# Patient Record
Sex: FEMALE | Race: WHITE | NOT HISPANIC OR LATINO | Employment: UNEMPLOYED | URBAN - METROPOLITAN AREA
[De-identification: names, ages, dates, MRNs, and addresses within clinical notes are randomized per-mention and may not be internally consistent; named-entity substitution may affect disease eponyms.]

---

## 2023-10-10 ENCOUNTER — HOSPITAL ENCOUNTER (EMERGENCY)
Facility: HOSPITAL | Age: 53
Discharge: HOME | End: 2023-10-10
Attending: EMERGENCY MEDICINE
Payer: COMMERCIAL

## 2023-10-10 VITALS
BODY MASS INDEX: 26.2 KG/M2 | SYSTOLIC BLOOD PRESSURE: 120 MMHG | HEIGHT: 66 IN | OXYGEN SATURATION: 99 % | DIASTOLIC BLOOD PRESSURE: 63 MMHG | WEIGHT: 163 LBS | HEART RATE: 72 BPM | RESPIRATION RATE: 16 BRPM | TEMPERATURE: 98 F

## 2023-10-10 DIAGNOSIS — H81.399 EPISODIC PERIPHERAL VERTIGO: Primary | ICD-10-CM

## 2023-10-10 DIAGNOSIS — S16.1XXA CERVICAL STRAIN, ACUTE: ICD-10-CM

## 2023-10-10 DIAGNOSIS — S09.90XA CLOSED HEAD INJURY: ICD-10-CM

## 2023-10-10 DIAGNOSIS — R55 SYNCOPE: ICD-10-CM

## 2023-10-10 DIAGNOSIS — S39.012A LUMBAR STRAIN, INITIAL ENCOUNTER: ICD-10-CM

## 2023-10-10 LAB
ALBUMIN SERPL BCP-MCNC: 4.4 G/DL (ref 3.5–5.2)
ALP SERPL-CCNC: 83 U/L (ref 55–135)
ALT SERPL W/O P-5'-P-CCNC: 15 U/L (ref 10–44)
ANION GAP SERPL CALC-SCNC: 10 MMOL/L (ref 8–16)
AST SERPL-CCNC: 20 U/L (ref 10–40)
BASOPHILS # BLD AUTO: 0.02 K/UL (ref 0–0.2)
BASOPHILS NFR BLD: 0.2 % (ref 0–1.9)
BILIRUB SERPL-MCNC: 0.4 MG/DL (ref 0.1–1)
BUN SERPL-MCNC: 16 MG/DL (ref 6–20)
CALCIUM SERPL-MCNC: 9.9 MG/DL (ref 8.7–10.5)
CHLORIDE SERPL-SCNC: 101 MMOL/L (ref 95–110)
CO2 SERPL-SCNC: 26 MMOL/L (ref 23–29)
CREAT SERPL-MCNC: 0.8 MG/DL (ref 0.5–1.4)
DIFFERENTIAL METHOD: ABNORMAL
EOSINOPHIL # BLD AUTO: 0 K/UL (ref 0–0.5)
EOSINOPHIL NFR BLD: 0.3 % (ref 0–8)
ERYTHROCYTE [DISTWIDTH] IN BLOOD BY AUTOMATED COUNT: 12.3 % (ref 11.5–14.5)
EST. GFR  (NO RACE VARIABLE): >60 ML/MIN/1.73 M^2
GLUCOSE SERPL-MCNC: 107 MG/DL (ref 70–110)
HCT VFR BLD AUTO: 36.2 % (ref 37–48.5)
HGB BLD-MCNC: 11.7 G/DL (ref 12–16)
IMM GRANULOCYTES # BLD AUTO: 0.02 K/UL (ref 0–0.04)
IMM GRANULOCYTES NFR BLD AUTO: 0.2 % (ref 0–0.5)
LYMPHOCYTES # BLD AUTO: 1.1 K/UL (ref 1–4.8)
LYMPHOCYTES NFR BLD: 11.6 % (ref 18–48)
MCH RBC QN AUTO: 29.4 PG (ref 27–31)
MCHC RBC AUTO-ENTMCNC: 32.3 G/DL (ref 32–36)
MCV RBC AUTO: 91 FL (ref 82–98)
MONOCYTES # BLD AUTO: 0.4 K/UL (ref 0.3–1)
MONOCYTES NFR BLD: 4.2 % (ref 4–15)
NEUTROPHILS # BLD AUTO: 7.8 K/UL (ref 1.8–7.7)
NEUTROPHILS NFR BLD: 83.5 % (ref 38–73)
NRBC BLD-RTO: 0 /100 WBC
PLATELET # BLD AUTO: 188 K/UL (ref 150–450)
PMV BLD AUTO: 11.3 FL (ref 9.2–12.9)
POCT GLUCOSE: 102 MG/DL (ref 70–110)
POTASSIUM SERPL-SCNC: 3.9 MMOL/L (ref 3.5–5.1)
PROT SERPL-MCNC: 7.7 G/DL (ref 6–8.4)
RBC # BLD AUTO: 3.98 M/UL (ref 4–5.4)
SODIUM SERPL-SCNC: 137 MMOL/L (ref 136–145)
WBC # BLD AUTO: 9.34 K/UL (ref 3.9–12.7)

## 2023-10-10 PROCEDURE — 80053 COMPREHEN METABOLIC PANEL: CPT | Performed by: EMERGENCY MEDICINE

## 2023-10-10 PROCEDURE — 93010 ELECTROCARDIOGRAM REPORT: CPT | Mod: ,,, | Performed by: INTERNAL MEDICINE

## 2023-10-10 PROCEDURE — 93010 EKG 12-LEAD: ICD-10-PCS | Mod: ,,, | Performed by: INTERNAL MEDICINE

## 2023-10-10 PROCEDURE — 85025 COMPLETE CBC W/AUTO DIFF WBC: CPT | Performed by: EMERGENCY MEDICINE

## 2023-10-10 PROCEDURE — 63600175 PHARM REV CODE 636 W HCPCS: Performed by: EMERGENCY MEDICINE

## 2023-10-10 PROCEDURE — 93005 ELECTROCARDIOGRAM TRACING: CPT

## 2023-10-10 PROCEDURE — 25000003 PHARM REV CODE 250: Performed by: EMERGENCY MEDICINE

## 2023-10-10 PROCEDURE — 99285 EMERGENCY DEPT VISIT HI MDM: CPT | Mod: 25

## 2023-10-10 PROCEDURE — 82962 GLUCOSE BLOOD TEST: CPT

## 2023-10-10 PROCEDURE — 96374 THER/PROPH/DIAG INJ IV PUSH: CPT

## 2023-10-10 RX ORDER — ONDANSETRON 4 MG/1
4 TABLET, ORALLY DISINTEGRATING ORAL
Status: COMPLETED | OUTPATIENT
Start: 2023-10-10 | End: 2023-10-10

## 2023-10-10 RX ORDER — HYDROCODONE BITARTRATE AND ACETAMINOPHEN 5; 325 MG/1; MG/1
1 TABLET ORAL EVERY 6 HOURS PRN
Qty: 12 TABLET | Refills: 0 | Status: SHIPPED | OUTPATIENT
Start: 2023-10-10

## 2023-10-10 RX ORDER — MORPHINE SULFATE 4 MG/ML
2 INJECTION, SOLUTION INTRAMUSCULAR; INTRAVENOUS
Status: COMPLETED | OUTPATIENT
Start: 2023-10-10 | End: 2023-10-10

## 2023-10-10 RX ADMIN — ONDANSETRON 4 MG: 4 TABLET, ORALLY DISINTEGRATING ORAL at 01:10

## 2023-10-10 RX ADMIN — SODIUM CHLORIDE 2000 ML: 9 INJECTION, SOLUTION INTRAVENOUS at 01:10

## 2023-10-10 RX ADMIN — MORPHINE SULFATE 2 MG: 4 INJECTION, SOLUTION INTRAMUSCULAR; INTRAVENOUS at 01:10

## 2023-10-10 NOTE — ED TRIAGE NOTES
"Pt arrives after "passing out" around 435 this morning, and after that fell an additional 4-5 times. States she fell from 3 steps and was unable to get up jameson to feeling weak and dizzy. Reports pain to right arms/ ribs, and tailbone. HX of vertigo.  "

## 2023-10-10 NOTE — ED PROVIDER NOTES
"Encounter Date: 10/10/2023       History     Chief Complaint   Patient presents with    Loss of Consciousness     States got dizzy and "passed out" x3 hours ago. States tried to get up, and fell 4 more times. Hit head on granite counter. Denies use of blood thinners. States started feel SOB after syncope. C/O RIGHT rib pain, neck pain, back pain and nausea. Reports hx vertigo and syncope. Denies unilateral weakness, slurred speech, or facial droop     HPI  This 53-year-old white female presents emergency room complaining of waking this morning at 4:30 a.m. in getting very dizzy and passing out.  The patient stood up and passed out several times this morning.  She is weak and dizzy with standing.  She had some shortness of breath earlier.  This is gone now.  She does have some nausea.  She has a history of vertigo but does not have vertigo today.  This is a generalized weakness sensation.  The patient has no focal neurologic deficits.  She does have some head pain after the trauma from the fall.  Review of patient's allergies indicates:   Allergen Reactions    Penicillins Hives     No past medical history on file.  No past surgical history on file.  No family history on file.  Social History     Tobacco Use    Smoking status: Former     Types: Cigarettes    Smokeless tobacco: Never   Substance Use Topics    Alcohol use: Not Currently    Drug use: Never     Review of Systems  The patient was questioned specifically with regard to the following.  General: Fever, chills, sweats. Neuro: Headache. Eyes: eye problems. ENT: Ear pain, sore throat. Cardiovascular: Chest pain. Respiratory: Cough, shortness of breath. Gastrointestinal: Abdominal pain, vomiting, diarrhea. Genitourinary: Painful urination.  Musculoskeletal: Arm and leg problems. Skin: Rash.  The review of systems was negative except for the following:  Headache, neck pain, syncope, generalized weakness, right shoulder pain.  Mild pain in the right elbow and right " wrist.  There is lumbar pain.  Physical Exam     Initial Vitals [10/10/23 1147]   BP Pulse Resp Temp SpO2   124/61 77 17 98.4 °F (36.9 °C) 100 %      MAP       --         Physical Exam  The patient was examined specifically for the following:   General:No significant distress, Good color, Warm and dry. Head and neck:Scalp atraumatic, Neck supple. Neurological:Appropriate conversation, Gross motor deficits. Eyes:Conjugate gaze, Clear corneas. ENT: No epistaxis. Cardiac: Regular rate and rhythm, Grossly normal heart tones. Pulmonary: Wheezing, Rales. Gastrointestinal: Abdominal tenderness, Abdominal distention. Musculoskeletal: Extremity deformity, Apparent pain with range of motion of the joints. Skin: Rash.   The findings on examination were normal except for the following:  The patient has some tenderness across the tops of her shoulders.  She is marked tenderness and pain with range of motion of the right shoulder.  There is midline cervical and midline lumbar tenderness.  The patient can stand and walk.  There is no ataxia or disequilibrium.  Mental status examination, cranial nerves, motor and sensory examination are normal.  The lungs are clear in the heart tones are normal.  Vital signs are stable.   ED Course   Procedures  Labs Reviewed   CBC W/ AUTO DIFFERENTIAL - Abnormal; Notable for the following components:       Result Value    RBC 3.98 (*)     Hemoglobin 11.7 (*)     Hematocrit 36.2 (*)     Gran # (ANC) 7.8 (*)     Gran % 83.5 (*)     Lymph % 11.6 (*)     All other components within normal limits   COMPREHENSIVE METABOLIC PANEL   POCT GLUCOSE   POCT GLUCOSE MONITORING CONTINUOUS     EKG Readings: (Independently Interpreted)   This patient is in normal sinus rhythm heart rate of 75.  There are no significant ST segment or T-wave changes.  There are low voltage QRS complexes.  There is no definite evidence of acute myocardial infarction or malignant arrhythmia.  This is doctor Gatica dictating an I  independently interpreted this EKG.       Imaging Results              CT Head Without Contrast (Final result)  Result time 10/10/23 14:39:13      Final result by Topher Live MD (10/10/23 14:39:13)                   Impression:      1. No acute intracranial abnormalities.      Electronically signed by: Topher Live MD  Date:    10/10/2023  Time:    14:39               Narrative:    EXAMINATION:  CT HEAD WITHOUT CONTRAST    CLINICAL HISTORY:  Head trauma, intracranial venous injury suspected; Unspecified injury of head, initial encounter    TECHNIQUE:  Low dose axial images were obtained through the head.  Coronal and sagittal reformations were also performed. Contrast was not administered.    COMPARISON:  None.    FINDINGS:  There is no evidence of acute major vascular territory infarct, hemorrhage, or mass.  There is no hydrocephalus.  There are no abnormal extra-axial fluid collections.  The paranasal sinuses and mastoid air cells are clear, and there is no evidence of calvarial fracture.  The visualized soft tissues are unremarkable.                                       X-Ray Lumbar Spine Ap And Lateral (Final result)  Result time 10/10/23 14:02:42      Final result by Topher Live MD (10/10/23 14:02:42)                   Impression:      1. No acute displaced fracture or dislocation of the lumbar spine.      Electronically signed by: Topher Live MD  Date:    10/10/2023  Time:    14:02               Narrative:    EXAMINATION:  XR LUMBAR SPINE AP AND LATERAL    CLINICAL HISTORY:  Lumbar strain;    TECHNIQUE:  AP, lateral and spot images were performed of the lumbar spine.    COMPARISON:  None    FINDINGS:  Three views lumbar spine.    Lateral imaging demonstrates minimal grade 1 anterolisthesis of L4 on L3.  There is multilevel mild disc space height loss primarily involving L5-S1.  There is multilevel lower lumbar facet arthropathy.  The sacral segments are aligned.  AP spinal alignment is  grossly unremarkable.  The bilateral sacroiliac joints are intact.                                       X-Ray Cervical Spine AP And Lateral (Final result)  Result time 10/10/23 14:03:39      Final result by Topher Live MD (10/10/23 14:03:39)                   Impression:      1. No acute displaced fracture or dislocation of the cervical spine.      Electronically signed by: Topher Live MD  Date:    10/10/2023  Time:    14:03               Narrative:    EXAMINATION:  XR CERVICAL SPINE AP LATERAL    CLINICAL HISTORY:  Strain of muscle, fascia and tendon at neck level, initial encounter    TECHNIQUE:  AP, lateral and open mouth views of the cervical spine were performed.    COMPARISON:  None.    FINDINGS:  Three views cervical spine.    Lateral imaging demonstrates adequate alignment of the cervical spine without significant vertebral body height loss.  There is disc space height loss involving C5-C6 and C6-C7.  The facet joints are aligned.  There is prominent anterior marginal osteophytosis projecting from C5, C6 and C7.  AP spinal alignment is unremarkable.  The visualized lung apices are clear.  The odontoid is intact.  The lateral masses of C1 are in anatomic relationship with C2.                                       X-ray Shoulder 2 or More Views Right (Final result)  Result time 10/10/23 14:00:54   Procedure changed from X-Ray Shoulder Trauma 3 view Right     Final result by Topher Live MD (10/10/23 14:00:54)                   Impression:      1. No acute displaced fracture or dislocation of the right shoulder.      Electronically signed by: Topher Live MD  Date:    10/10/2023  Time:    14:00               Narrative:    EXAMINATION:  XR SHOULDER COMPLETE 2 OR MORE VIEWS RIGHT    CLINICAL HISTORY:  Right shoulder contusion;    TECHNIQUE:  Two or three views of the right shoulder were performed.    COMPARISON:  None    FINDINGS:  Two views right shoulder.    The right humeral head  maintains appropriate relationship with the glenoid.  There is a punctate focus of calcification along the superolateral aspect of the humeral head suggesting sequela of calcific tendinosis.  The acromioclavicular joint is intact noting degenerative change.  No acute displaced rib fracture.  The right lung zones are grossly clear.  There is suspected remote injury of the clavicle.                                       Medications   morphine injection 2 mg (2 mg Intravenous Given 10/10/23 1314)   ondansetron disintegrating tablet 4 mg (4 mg Oral Given 10/10/23 1315)   sodium chloride 0.9% bolus 2,000 mL 2,000 mL (2,000 mLs Intravenous New Bag 10/10/23 1313)     Medical Decision Making  Amount and/or Complexity of Data Reviewed  Labs: ordered.  Radiology: ordered.    Risk  Prescription drug management.    Given the above, this patient presents to the emergency room complaining of syncope.  The patient had multiple episodes of syncope.  The patient fell and injured her coccyx.  She hit her head.  She hurt her right shoulder.  She had no chest pain shortness of breath or palpitations.  The patient was observed in the emergency room on the monitor.  There was no significant ectopy noted.  The patient reports a history of a heart murmur.  I do not hear a significant heart murmur.  There were no significant orthostatic vital sign changes.  The patient was not tachycardic during her evaluation.  CT of the head fails to reveal any intracranial bleeding given the history of fall with head trauma.  There is  significant anemia noted on laboratory evaluation.  The patient's hemoglobin was 12.  This is a mild anemia.  There is no leukocytosis to suggest the presence of infection.  The patient has chemistries are essentially unremarkable.  There is no evidence of hepatic or renal failure there are no electrolyte abnormalities.  I was able to have the patient stand and walk.  There is no ataxia.  I doubt posterior circulation  stroke.  The patient had good radial pulses.  Autonomic dysfunction is considered.  Though the patient has no history of vertigo today.  She reports a history of chronic vertigo that she treats with meclizine.  This is never been evaluated by ENT.  I will refer the patient for evaluation for her chronic peripheral vertigo.  The patient is otherwise neurologically intact.  There is no radiographic evidence of fractures of the lumbar and cervical spine.  The patient has no significant respiratory distress.  Oxygen saturations are 100%.  I doubt pulmonary embolus.  The patient is not short of breath.                           Clinical Impression:   Final diagnoses:  [R55] Syncope  [S09.90XA] Closed head injury  [S16.1XXA] Cervical strain, acute  [H81.399] Episodic peripheral vertigo (Primary)  [S39.012A] Lumbar strain, initial encounter        ED Disposition Condition    Discharge Stable          ED Prescriptions       Medication Sig Dispense Start Date End Date Auth. Provider    HYDROcodone-acetaminophen (NORCO) 5-325 mg per tablet Take 1 tablet by mouth every 6 (six) hours as needed. 12 tablet 10/10/2023 -- Heriberto Gatica MD          Follow-up Information       Follow up With Specialties Details Why Contact Info    Desirae Mcmahon MD Cardiology, Interventional Cardiology In 3 days Call today for an appointment [address]  [address]  [facility] [address]  [phone]      Radha Joe MD Otolaryngology In 1 week Call for an appointment for evaluation of your peripheral vertigo [address] [place] [address]  [phone]               Heriberto Gatica MD  10/10/23 7115

## 2023-10-10 NOTE — DISCHARGE INSTRUCTIONS
Please return immediately if you get worse or if new problems develop.  Please follow-up with the cardiologist above.  Call today for an appointment.  Lots of liquids.  Tylenol 1000 mg by mouth every 8 hours as needed for pain or Tylenol with hydrocodone as directed.  Ice her injuries for 24 hours, then you may use heat.  Please follow-up with the Ear Nose and Throat doctor above about your chronic episodic peripheral vertigo.

## 2024-10-18 ENCOUNTER — PATIENT MESSAGE (OUTPATIENT)
Dept: HEMATOLOGY/ONCOLOGY | Facility: CLINIC | Age: 54
End: 2024-10-18
Payer: COMMERCIAL

## 2024-10-18 ENCOUNTER — TELEPHONE (OUTPATIENT)
Dept: HEMATOLOGY/ONCOLOGY | Facility: CLINIC | Age: 54
End: 2024-10-18
Payer: COMMERCIAL

## 2024-10-18 NOTE — TELEPHONE ENCOUNTER
Called patient and left message to call back to set up a genetic appointment , outside referral received.

## 2024-10-22 ENCOUNTER — TELEPHONE (OUTPATIENT)
Dept: HEMATOLOGY/ONCOLOGY | Facility: CLINIC | Age: 54
End: 2024-10-22
Payer: COMMERCIAL

## 2024-10-22 NOTE — TELEPHONE ENCOUNTER
Called and spoke to patient to confirm the in office appointment on 10/25/24 and to complete the genetic questionnaire at least 2 days before the appointment

## 2024-10-25 ENCOUNTER — TELEPHONE (OUTPATIENT)
Dept: HEMATOLOGY/ONCOLOGY | Facility: CLINIC | Age: 54
End: 2024-10-25
Payer: COMMERCIAL